# Patient Record
Sex: FEMALE | Race: WHITE | NOT HISPANIC OR LATINO | Employment: PART TIME | URBAN - METROPOLITAN AREA
[De-identification: names, ages, dates, MRNs, and addresses within clinical notes are randomized per-mention and may not be internally consistent; named-entity substitution may affect disease eponyms.]

---

## 2019-07-17 ENCOUNTER — CONSULT (OUTPATIENT)
Dept: PLASTIC SURGERY | Facility: HOSPITAL | Age: 69
End: 2019-07-17
Payer: MEDICARE

## 2019-07-17 VITALS
BODY MASS INDEX: 25.95 KG/M2 | SYSTOLIC BLOOD PRESSURE: 151 MMHG | DIASTOLIC BLOOD PRESSURE: 82 MMHG | WEIGHT: 152 LBS | HEART RATE: 74 BPM | TEMPERATURE: 97.8 F | HEIGHT: 64 IN

## 2019-07-17 DIAGNOSIS — C44.311 BASAL CELL CARCINOMA (BCC) OF SKIN OF NOSE: Primary | ICD-10-CM

## 2019-07-17 PROCEDURE — 99203 OFFICE O/P NEW LOW 30 MIN: CPT | Performed by: SURGERY

## 2019-07-17 NOTE — H&P (VIEW-ONLY)
Assessment/Plan:  Please see HPI  We discussed potential options for reconstruction of the anticipated Mohs defect of the right nasal ala where she has a basal cell carcinoma  The options would include full-thickness skin grafting, adjacent tissue transfer/local flap such as a nasolabial flap, bilobed flap, etc, potentially a composite graft from the ear  We discussed the procedures, how they are performed, as well as potential risks, complications and limitations  Their questions have been answered to their satisfaction and consent has been obtained  There are no diagnoses linked to this encounter  Subjective:   Basal cell carcinoma     Patient ID: Fabrice Ding is a 71 y o  female  HPI Deanna Osorio has a recent diagnosis of basal cell carcinoma of the right nasal ala, she has been referred by Dr Shalonda Gomez  to discuss options for post Mohs reconstruction  The following portions of the patient's history were reviewed and updated as appropriate: allergies, current medications, past family history, past medical history, past social history, past surgical history and problem list     Review of Systems   Constitutional: Negative for chills and fever  HENT: Negative for hearing loss  Eyes: Negative for discharge and visual disturbance  Respiratory: Negative for chest tightness and shortness of breath  Cardiovascular: Negative for chest pain and leg swelling  Gastrointestinal: Negative for blood in stool, constipation, diarrhea and nausea  Genitourinary: Negative for dysuria  Musculoskeletal: Negative for gait problem  Skin: Negative for rash  Allergic/Immunologic: Negative for immunocompromised state  Neurological: Negative for seizures and headaches  Hematological: Does not bruise/bleed easily  Psychiatric/Behavioral: Negative for dysphoric mood  The patient is not nervous/anxious            Objective:      /82   Pulse 74 Comment: pulse ox 98  Temp 97 8 °F (36 6 °C) Ht 5' 4" (1 626 m)   Wt 68 9 kg (152 lb)   BMI 26 09 kg/m²          Physical Exam   Constitutional: She is oriented to person, place, and time  She appears well-developed and well-nourished  HENT:   Head: Normocephalic  Eyes: Pupils are equal, round, and reactive to light  Neck: Normal range of motion  Cardiovascular: Normal rate  Pulmonary/Chest: Effort normal    Abdominal: Soft  Musculoskeletal: Normal range of motion  Neurological: She is alert and oriented to person, place, and time  Skin: Skin is warm  Slightly crusted healing biopsy site right nasal ala, approximately 1 cm in diameter   Psychiatric: She has a normal mood and affect

## 2019-07-17 NOTE — PROGRESS NOTES
Assessment/Plan:  Please see HPI  We discussed potential options for reconstruction of the anticipated Mohs defect of the right nasal ala where she has a basal cell carcinoma  The options would include full-thickness skin grafting, adjacent tissue transfer/local flap such as a nasolabial flap, bilobed flap, etc, potentially a composite graft from the ear  We discussed the procedures, how they are performed, as well as potential risks, complications and limitations  Their questions have been answered to their satisfaction and consent has been obtained  There are no diagnoses linked to this encounter  Subjective:   Basal cell carcinoma     Patient ID: Eugenia Michaels is a 71 y o  female  HPI Harvinder Robles has a recent diagnosis of basal cell carcinoma of the right nasal ala, she has been referred by Dr Cammy Davila  to discuss options for post Mohs reconstruction  The following portions of the patient's history were reviewed and updated as appropriate: allergies, current medications, past family history, past medical history, past social history, past surgical history and problem list     Review of Systems   Constitutional: Negative for chills and fever  HENT: Negative for hearing loss  Eyes: Negative for discharge and visual disturbance  Respiratory: Negative for chest tightness and shortness of breath  Cardiovascular: Negative for chest pain and leg swelling  Gastrointestinal: Negative for blood in stool, constipation, diarrhea and nausea  Genitourinary: Negative for dysuria  Musculoskeletal: Negative for gait problem  Skin: Negative for rash  Allergic/Immunologic: Negative for immunocompromised state  Neurological: Negative for seizures and headaches  Hematological: Does not bruise/bleed easily  Psychiatric/Behavioral: Negative for dysphoric mood  The patient is not nervous/anxious            Objective:      /82   Pulse 74 Comment: pulse ox 98  Temp 97 8 °F (36 6 °C) Ht 5' 4" (1 626 m)   Wt 68 9 kg (152 lb)   BMI 26 09 kg/m²          Physical Exam   Constitutional: She is oriented to person, place, and time  She appears well-developed and well-nourished  HENT:   Head: Normocephalic  Eyes: Pupils are equal, round, and reactive to light  Neck: Normal range of motion  Cardiovascular: Normal rate  Pulmonary/Chest: Effort normal    Abdominal: Soft  Musculoskeletal: Normal range of motion  Neurological: She is alert and oriented to person, place, and time  Skin: Skin is warm  Slightly crusted healing biopsy site right nasal ala, approximately 1 cm in diameter   Psychiatric: She has a normal mood and affect

## 2019-07-17 NOTE — LETTER
July 17, 2019     Falguni Wallace MD  [address]     Patient: Tuan Turk   YOB: 1950   Date of Visit: 7/17/2019       Dear Dr Franics Centeno: Thank you for referring Tuan Turk to me for evaluation  Below are my notes for this consultation  If you have questions, please do not hesitate to call me  I look forward to following your patient along with you  Sincerely,        Janey Francis MD        CC: No Recipients  Janey Francis MD  7/17/2019 11:29 AM  Sign at close encounter  Assessment/Plan:  Please see HPI  We discussed potential options for reconstruction of the anticipated Mohs defect of the right nasal ala where she has a basal cell carcinoma  The options would include full-thickness skin grafting, adjacent tissue transfer/local flap such as a nasolabial flap, bilobed flap, etc, potentially a composite graft from the ear  We discussed the procedures, how they are performed, as well as potential risks, complications and limitations  Their questions have been answered to their satisfaction and consent has been obtained  There are no diagnoses linked to this encounter  Subjective:   Basal cell carcinoma     Patient ID: Tuan Turk is a 71 y o  female  [facility] has a recent diagnosis of basal cell carcinoma of the right nasal ala, she has been referred by Dr Francis Centeno  to discuss options for post Mohs reconstruction  The following portions of the patient's history were reviewed and updated as appropriate: allergies, current medications, past family history, past medical history, past social history, past surgical history and problem list     Review of Systems   Constitutional: Negative for chills and fever  HENT: Negative for hearing loss  Eyes: Negative for discharge and visual disturbance  Respiratory: Negative for chest tightness and shortness of breath      Cardiovascular: Negative for chest pain and leg swelling  Gastrointestinal: Negative for blood in stool, constipation, diarrhea and nausea  Genitourinary: Negative for dysuria  Musculoskeletal: Negative for gait problem  Skin: Negative for rash  Allergic/Immunologic: Negative for immunocompromised state  Neurological: Negative for seizures and headaches  Hematological: Does not bruise/bleed easily  Psychiatric/Behavioral: Negative for dysphoric mood  The patient is not nervous/anxious  Objective:      /82   Pulse 74 Comment: pulse ox 98  Temp 97 8 °F (36 6 °C)   Ht 5' 4" (1 626 m)   Wt 68 9 kg (152 lb)   BMI 26 09 kg/m²           Physical Exam   Constitutional: She is oriented to person, place, and time  She appears well-developed and well-nourished  HENT:   Head: Normocephalic  Eyes: Pupils are equal, round, and reactive to light  Neck: Normal range of motion  Cardiovascular: Normal rate  Pulmonary/Chest: Effort normal    Abdominal: Soft  Musculoskeletal: Normal range of motion  Neurological: She is alert and oriented to person, place, and time  Skin: Skin is warm  Slightly crusted healing biopsy site right nasal ala, approximately 1 cm in diameter   Psychiatric: She has a normal mood and affect

## 2019-07-18 PROBLEM — C44.311 BASAL CELL CARCINOMA OF SKIN OF NOSE: Status: ACTIVE | Noted: 2019-07-18

## 2019-07-22 ENCOUNTER — ANESTHESIA EVENT (OUTPATIENT)
Dept: PERIOP | Facility: AMBULARY SURGERY CENTER | Age: 69
End: 2019-07-22
Payer: MEDICARE

## 2019-07-22 RX ORDER — VITAMIN B COMPLEX
1 CAPSULE ORAL DAILY
COMMUNITY

## 2019-07-22 RX ORDER — OMEGA-3-ACID ETHYL ESTERS 1 G/1
2 CAPSULE, LIQUID FILLED ORAL 2 TIMES DAILY
COMMUNITY

## 2019-07-22 RX ORDER — MAGNESIUM 30 MG
30 TABLET ORAL 2 TIMES DAILY
COMMUNITY

## 2019-07-22 RX ORDER — DIPHENOXYLATE HYDROCHLORIDE AND ATROPINE SULFATE 2.5; .025 MG/1; MG/1
1 TABLET ORAL DAILY
COMMUNITY

## 2019-07-22 NOTE — PRE-PROCEDURE INSTRUCTIONS
Pre-Surgery Instructions:   Medication Instructions    b complex vitamins capsule Patient was instructed by Physician and understands   magnesium 30 MG tablet Patient was instructed by Physician and understands   multivitamin [facility]) TABS Patient was instructed by Physician and understands   omega-3-acid ethyl esters (LOVAZA) 1 g capsule Patient was instructed by Physician and understands  Pre op and bathing instructions reviewed

## 2019-07-23 ENCOUNTER — CLINICAL SUPPORT (OUTPATIENT)
Dept: URGENT CARE | Facility: MEDICAL CENTER | Age: 69
End: 2019-07-23
Payer: MEDICARE

## 2019-07-23 ENCOUNTER — APPOINTMENT (OUTPATIENT)
Dept: LAB | Facility: MEDICAL CENTER | Age: 69
End: 2019-07-23
Payer: MEDICARE

## 2019-07-23 DIAGNOSIS — C44.311 BASAL CELL CARCINOMA OF SKIN OF NOSE: ICD-10-CM

## 2019-07-23 LAB
ANION GAP SERPL CALCULATED.3IONS-SCNC: 5 MMOL/L (ref 4–13)
ATRIAL RATE: 59 BPM
BASOPHILS # BLD AUTO: 0.07 THOUSANDS/ΜL (ref 0–0.1)
BASOPHILS NFR BLD AUTO: 1 % (ref 0–1)
BUN SERPL-MCNC: 16 MG/DL (ref 5–25)
CALCIUM SERPL-MCNC: 9.3 MG/DL (ref 8.3–10.1)
CHLORIDE SERPL-SCNC: 110 MMOL/L (ref 100–108)
CO2 SERPL-SCNC: 30 MMOL/L (ref 21–32)
CREAT SERPL-MCNC: 0.71 MG/DL (ref 0.6–1.3)
EOSINOPHIL # BLD AUTO: 0.14 THOUSAND/ΜL (ref 0–0.61)
EOSINOPHIL NFR BLD AUTO: 2 % (ref 0–6)
ERYTHROCYTE [DISTWIDTH] IN BLOOD BY AUTOMATED COUNT: 13.8 % (ref 11.6–15.1)
GFR SERPL CREATININE-BSD FRML MDRD: 87 ML/MIN/1.73SQ M
GLUCOSE P FAST SERPL-MCNC: 86 MG/DL (ref 65–99)
HCT VFR BLD AUTO: 39.9 % (ref 34.8–46.1)
HGB BLD-MCNC: 12.6 G/DL (ref 11.5–15.4)
IMM GRANULOCYTES # BLD AUTO: 0.03 THOUSAND/UL (ref 0–0.2)
IMM GRANULOCYTES NFR BLD AUTO: 0 % (ref 0–2)
LYMPHOCYTES # BLD AUTO: 2.53 THOUSANDS/ΜL (ref 0.6–4.47)
LYMPHOCYTES NFR BLD AUTO: 38 % (ref 14–44)
MCH RBC QN AUTO: 30.4 PG (ref 26.8–34.3)
MCHC RBC AUTO-ENTMCNC: 31.6 G/DL (ref 31.4–37.4)
MCV RBC AUTO: 96 FL (ref 82–98)
MONOCYTES # BLD AUTO: 0.71 THOUSAND/ΜL (ref 0.17–1.22)
MONOCYTES NFR BLD AUTO: 11 % (ref 4–12)
NEUTROPHILS # BLD AUTO: 3.19 THOUSANDS/ΜL (ref 1.85–7.62)
NEUTS SEG NFR BLD AUTO: 48 % (ref 43–75)
NRBC BLD AUTO-RTO: 0 /100 WBCS
P AXIS: 72 DEGREES
PLATELET # BLD AUTO: 244 THOUSANDS/UL (ref 149–390)
PMV BLD AUTO: 11.7 FL (ref 8.9–12.7)
POTASSIUM SERPL-SCNC: 4.4 MMOL/L (ref 3.5–5.3)
PR INTERVAL: 146 MS
QRS AXIS: 39 DEGREES
QRSD INTERVAL: 78 MS
QT INTERVAL: 428 MS
QTC INTERVAL: 423 MS
RBC # BLD AUTO: 4.15 MILLION/UL (ref 3.81–5.12)
SODIUM SERPL-SCNC: 145 MMOL/L (ref 136–145)
T WAVE AXIS: 44 DEGREES
VENTRICULAR RATE: 59 BPM
WBC # BLD AUTO: 6.67 THOUSAND/UL (ref 4.31–10.16)

## 2019-07-23 PROCEDURE — 80048 BASIC METABOLIC PNL TOTAL CA: CPT

## 2019-07-23 PROCEDURE — 85025 COMPLETE CBC W/AUTO DIFF WBC: CPT

## 2019-07-23 PROCEDURE — 36415 COLL VENOUS BLD VENIPUNCTURE: CPT

## 2019-07-23 PROCEDURE — 93010 ELECTROCARDIOGRAM REPORT: CPT | Performed by: INTERNAL MEDICINE

## 2019-07-30 ENCOUNTER — HOSPITAL ENCOUNTER (OUTPATIENT)
Facility: AMBULARY SURGERY CENTER | Age: 69
Setting detail: OUTPATIENT SURGERY
Discharge: HOME | End: 2019-07-30
Attending: SURGERY | Admitting: SURGERY
Payer: MEDICARE

## 2019-07-30 ENCOUNTER — ANESTHESIA (OUTPATIENT)
Dept: PERIOP | Facility: AMBULARY SURGERY CENTER | Age: 69
End: 2019-07-30
Payer: MEDICARE

## 2019-07-30 VITALS
WEIGHT: 150 LBS | OXYGEN SATURATION: 99 % | HEART RATE: 78 BPM | HEIGHT: 64 IN | DIASTOLIC BLOOD PRESSURE: 68 MMHG | SYSTOLIC BLOOD PRESSURE: 122 MMHG | BODY MASS INDEX: 25.61 KG/M2 | RESPIRATION RATE: 16 BRPM | TEMPERATURE: 98.2 F

## 2019-07-30 PROCEDURE — 15004 WOUND PREP F/N/HF/G: CPT | Performed by: SURGERY

## 2019-07-30 PROCEDURE — 14061 TIS TRNFR E/N/E/L10.1-30SQCM: CPT | Performed by: SURGERY

## 2019-07-30 RX ORDER — SODIUM CHLORIDE, SODIUM LACTATE, POTASSIUM CHLORIDE, CALCIUM CHLORIDE 600; 310; 30; 20 MG/100ML; MG/100ML; MG/100ML; MG/100ML
125 INJECTION, SOLUTION INTRAVENOUS CONTINUOUS
Status: DISCONTINUED | OUTPATIENT
Start: 2019-07-30 | End: 2019-07-30 | Stop reason: HOSPADM

## 2019-07-30 RX ORDER — ONDANSETRON 2 MG/ML
INJECTION INTRAMUSCULAR; INTRAVENOUS AS NEEDED
Status: DISCONTINUED | OUTPATIENT
Start: 2019-07-30 | End: 2019-07-30 | Stop reason: SURG

## 2019-07-30 RX ORDER — ACETAMINOPHEN 325 MG/1
650 TABLET ORAL EVERY 6 HOURS PRN
Status: DISCONTINUED | OUTPATIENT
Start: 2019-07-30 | End: 2019-07-30 | Stop reason: HOSPADM

## 2019-07-30 RX ORDER — GLYCOPYRROLATE 0.2 MG/ML
INJECTION INTRAMUSCULAR; INTRAVENOUS AS NEEDED
Status: DISCONTINUED | OUTPATIENT
Start: 2019-07-30 | End: 2019-07-30 | Stop reason: SURG

## 2019-07-30 RX ORDER — PROPOFOL 10 MG/ML
INJECTION, EMULSION INTRAVENOUS AS NEEDED
Status: DISCONTINUED | OUTPATIENT
Start: 2019-07-30 | End: 2019-07-30 | Stop reason: SURG

## 2019-07-30 RX ORDER — GINSENG 100 MG
CAPSULE ORAL AS NEEDED
Status: DISCONTINUED | OUTPATIENT
Start: 2019-07-30 | End: 2019-07-30 | Stop reason: HOSPADM

## 2019-07-30 RX ORDER — FENTANYL CITRATE 50 UG/ML
INJECTION, SOLUTION INTRAMUSCULAR; INTRAVENOUS AS NEEDED
Status: DISCONTINUED | OUTPATIENT
Start: 2019-07-30 | End: 2019-07-30 | Stop reason: SURG

## 2019-07-30 RX ORDER — CEFAZOLIN SODIUM 1 G/50ML
1000 SOLUTION INTRAVENOUS ONCE
Status: COMPLETED | OUTPATIENT
Start: 2019-07-30 | End: 2019-07-30

## 2019-07-30 RX ORDER — MAGNESIUM HYDROXIDE 1200 MG/15ML
LIQUID ORAL AS NEEDED
Status: DISCONTINUED | OUTPATIENT
Start: 2019-07-30 | End: 2019-07-30 | Stop reason: HOSPADM

## 2019-07-30 RX ORDER — LIDOCAINE HYDROCHLORIDE 10 MG/ML
INJECTION, SOLUTION INFILTRATION; PERINEURAL AS NEEDED
Status: DISCONTINUED | OUTPATIENT
Start: 2019-07-30 | End: 2019-07-30 | Stop reason: SURG

## 2019-07-30 RX ORDER — DEXAMETHASONE SODIUM PHOSPHATE 10 MG/ML
INJECTION, SOLUTION INTRAMUSCULAR; INTRAVENOUS AS NEEDED
Status: DISCONTINUED | OUTPATIENT
Start: 2019-07-30 | End: 2019-07-30 | Stop reason: SURG

## 2019-07-30 RX ORDER — LIDOCAINE HYDROCHLORIDE 10 MG/ML
0.5 INJECTION, SOLUTION EPIDURAL; INFILTRATION; INTRACAUDAL; PERINEURAL ONCE AS NEEDED
Status: DISCONTINUED | OUTPATIENT
Start: 2019-07-30 | End: 2019-07-30 | Stop reason: HOSPADM

## 2019-07-30 RX ORDER — FENTANYL CITRATE/PF 50 MCG/ML
25 SYRINGE (ML) INJECTION
Status: DISCONTINUED | OUTPATIENT
Start: 2019-07-30 | End: 2019-07-30 | Stop reason: HOSPADM

## 2019-07-30 RX ORDER — LIDOCAINE HYDROCHLORIDE AND EPINEPHRINE 10; 10 MG/ML; UG/ML
INJECTION, SOLUTION INFILTRATION; PERINEURAL AS NEEDED
Status: DISCONTINUED | OUTPATIENT
Start: 2019-07-30 | End: 2019-07-30 | Stop reason: HOSPADM

## 2019-07-30 RX ORDER — ONDANSETRON 2 MG/ML
4 INJECTION INTRAMUSCULAR; INTRAVENOUS ONCE AS NEEDED
Status: DISCONTINUED | OUTPATIENT
Start: 2019-07-30 | End: 2019-07-30 | Stop reason: HOSPADM

## 2019-07-30 RX ADMIN — PROPOFOL 200 MG: 10 INJECTION, EMULSION INTRAVENOUS at 13:05

## 2019-07-30 RX ADMIN — ONDANSETRON 4 MG: 2 INJECTION INTRAMUSCULAR; INTRAVENOUS at 13:01

## 2019-07-30 RX ADMIN — FENTANYL CITRATE 25 MCG: 50 INJECTION, SOLUTION INTRAMUSCULAR; INTRAVENOUS at 14:07

## 2019-07-30 RX ADMIN — PHENYLEPHRINE HYDROCHLORIDE 50 MCG: 10 INJECTION INTRAVENOUS at 13:36

## 2019-07-30 RX ADMIN — LIDOCAINE HYDROCHLORIDE ANHYDROUS 50 MG: 10 INJECTION, SOLUTION INFILTRATION at 13:05

## 2019-07-30 RX ADMIN — DEXAMETHASONE SODIUM PHOSPHATE 4 MG: 10 INJECTION, SOLUTION INTRAMUSCULAR; INTRAVENOUS at 13:07

## 2019-07-30 RX ADMIN — FENTANYL CITRATE 50 MCG: 50 INJECTION, SOLUTION INTRAMUSCULAR; INTRAVENOUS at 14:05

## 2019-07-30 RX ADMIN — GLYCOPYRROLATE 0.1 MG: 0.2 INJECTION, SOLUTION INTRAMUSCULAR; INTRAVENOUS at 13:01

## 2019-07-30 RX ADMIN — PHENYLEPHRINE HYDROCHLORIDE 150 MCG: 10 INJECTION INTRAVENOUS at 13:29

## 2019-07-30 RX ADMIN — PHENYLEPHRINE HYDROCHLORIDE 50 MCG: 10 INJECTION INTRAVENOUS at 13:39

## 2019-07-30 RX ADMIN — CEFAZOLIN SODIUM 1000 MG: 1 SOLUTION INTRAVENOUS at 13:00

## 2019-07-30 RX ADMIN — SODIUM CHLORIDE, SODIUM LACTATE, POTASSIUM CHLORIDE, AND CALCIUM CHLORIDE: .6; .31; .03; .02 INJECTION, SOLUTION INTRAVENOUS at 12:30

## 2019-07-30 RX ADMIN — FENTANYL CITRATE 25 MCG: 50 INJECTION, SOLUTION INTRAMUSCULAR; INTRAVENOUS at 14:03

## 2019-07-30 NOTE — OP NOTE
OPERATIVE REPORT  PATIENT NAME: Trinidad Perez    :  1950  MRN: [id number]  Pt Location: [facility] OR [facility] 06    SURGERY DATE: 2019    Surgeon(s) and Role:     * Malathi Romo MD - Primary     * Katty Martínez PA-C - Assisting    Preop Diagnosis:  Basal cell carcinoma of skin of nose [C44 311]    Post-Op Diagnosis Codes: * Basal cell carcinoma of skin of nose [C44 311]    Procedure:  1  Preparation Mohs defect right nasal ala, excision of wound margins to prepare for reconstruction () 2  Reconstruction Mohs defect right nasal ala with superiorly based nasolabial flap 3 5 by 3 0 cm  Specimen(s):  * No specimens in log *    Estimated Blood Loss:   Minimal    Drains:  * No LDAs found *    Anesthesia Type:   General/LMA    Operative Indications:  Basal cell carcinoma of skin of nose [C44 311]  Status post Mohs    Operative Findings:  As above    Complications:   None    Procedure and Technique:  Salo Baugh was seen preoperatively in the holding area and the surgical site was marked with her participation  We reviewed the planned procedure as well as potential risks, complications, and limitations  She was then taken to the operating room and underwent induction of anesthesia by the anesthesia personnel  The operative field was prepped and draped in sterile fashion a proper time-out was performed  2 5 loupe magnification was used throughout the procedure to aid visualization  The defect was carefully analyzed and the margins were infiltrated with xylocaine with epinephrine  A 15 blade was then used to excise the circumferential margin in order to remove the cautery artifact and to prepare the wound for reconstruction  Given the size and location of the defect, reconstruction was planned with a superiorly based nasolabial flap  The margins were marked out with a surgical marking pen and infiltrated with xylocaine with epinephrine    Skin incisions were created with a 15 blade, carried down through the dermis and the flap was elevated from distal proximal toward its base utilizing a spreading technique with tenotomy scissors  The wound was irrigated hemostasis assured  The donor site defect was closed initially with a 4-0 PDS suture sutured at the level of the deep dermis down to the underlying periosteum in order to recreate the nasal facial sulcus  Remainder of the donor site was closed with 5 O Monocryl buried at the level of the deep dermis this was followed by running subcuticular 5 O Monocryl  The flap was trimmed distally to fit the defect, and was inset at the distal most aspect utilizing 5 O Vicryl buried at the level of the deep dermis  The distal 2/3 were then closed at the skin level utilizing 6 0 nylon suture  Prior to insetting the flap that was the end in order to provide adequate thickness to match the depth of the defect  The undersurface of the flap was protected with bacitracin and Xeroform and a Steri-Strip was applied to the donor site  The patient was transferred to the recovery room     I was present for the entire procedure    Patient Disposition:  PACU     SIGNATURE: Marco Antonio Vidal MD  DATE: July 30, 2019  TIME: 2:52 PM

## 2019-07-30 NOTE — ANESTHESIA POSTPROCEDURE EVALUATION
Post-Op Assessment Note    CV Status:  Stable  Pain Score: 0    Pain management: adequate     Mental Status:  Alert and awake   Hydration Status:  Euvolemic   PONV Controlled:  Controlled   Airway Patency:  Patent   Post Op Vitals Reviewed: Yes      Staff: Anesthesiologist           BP   126/90   Temp   97 6   Pulse  78   Resp   18   SpO2   100

## 2019-07-30 NOTE — ANESTHESIA PREPROCEDURE EVALUATION
Review of Systems/Medical History  Patient summary reviewed  Chart reviewed  No history of anesthetic complications     Cardiovascular  Negative cardio ROS Exercise tolerance (METS): >4,  No CERDA,    Pulmonary  Not a smoker , No shortness of breath, No recent URI ,        GI/Hepatic    No GERD ,             Endo/Other     GYN       Hematology   Musculoskeletal  Osteoarthritis,   Comment: Basal cell carcinoma Arthritis     Neurology   Psychology           Physical Exam    Airway    Mallampati score: II  TM Distance: <3 FB  Neck ROM: full     Dental   No notable dental hx     Cardiovascular  Comment: Negative ROS, Rhythm: regular, Cardiovascular exam normal    Pulmonary  Pulmonary exam normal Breath sounds clear to auscultation,     Other Findings        Anesthesia Plan  ASA Score- 2     Anesthesia Type- general with ASA Monitors  Additional Monitors:   Airway Plan: LMA  Plan Factors-    Induction- intravenous  Postoperative Plan- Plan for postoperative opioid use  Planned trial extubation    Informed Consent- Anesthetic plan and risks discussed with patient  I personally reviewed this patient with the CRNA  Discussed and agreed on the Anesthesia Plan with the CRNA  Janett Crabtree

## 2019-07-30 NOTE — INTERIM OP NOTE
NOSE MOHS DEFECT RECONSTRUCTION, NOSE FLAP  Postoperative Note  PATIENT NAME: Temo Dias  : 1950  MRN: [id number]  [facility]    Surgery Date: 2019    Preop Diagnosis:  Basal cell carcinoma of skin of nose [C44 311]    Post-Op Diagnosis Codes:      * Basal cell carcinoma of skin of nose [C44 311]    Procedure(s) (LRB):  NOSE MOHS DEFECT RECONSTRUCTION (N/A)  NOSE FLAP (N/A)    Surgeon(s) and Role:     * Edilma Cruz MD - Primary     * Comfort Villegas PA-C - Assisting    Specimens:  * No specimens in log *    Estimated Blood Loss:   Minimal    Anesthesia Type:   General/LMA     Findings:    see op note   Complications:   None    SIGNATURE: Sondra Bronson MD   DATE: 2019   TIME: 2:15 PM

## 2019-07-30 NOTE — DISCHARGE INSTRUCTIONS
[facility]  Dr Brower Musa   [address] [place] [address] 144, [address]  Phone: [phone]     Postoperative Instructions for Outpatient Surgery     These instructions are being provided by your doctor to give you basic guidelines during your post-op recovery  Please let our office know if your contact information has changed       Please call the office today for an appointment this Thursday 8/1/19 for postoperative care and dressing changes       Dressings: keep dressings dry and in place; do not remove       Activity Restrictions: No strenuous activity      Bathing:  Sponge bath only  Do not wet face or head     Medications:    Resume pre-op medications     You may take tylenol, aleve, or ibuprofen for pain control               Other:   Keep head of bed elevated greater than 30 degrees while in bed/sleeping  Do not apply ice to face/nose

## 2019-08-01 ENCOUNTER — OFFICE VISIT (OUTPATIENT)
Dept: PLASTIC SURGERY | Facility: CLINIC | Age: 69
End: 2019-08-01

## 2019-08-01 VITALS — BODY MASS INDEX: 25.61 KG/M2 | WEIGHT: 150 LBS | HEIGHT: 64 IN

## 2019-08-01 DIAGNOSIS — Z98.890 POST-OPERATIVE STATE: Primary | ICD-10-CM

## 2019-08-01 PROCEDURE — 99024 POSTOP FOLLOW-UP VISIT: CPT | Performed by: SURGERY

## 2019-08-01 NOTE — PATIENT INSTRUCTIONS
Patient to change dressings daily, apply Xeroform and Aquaphor and follow up in 2 weeks for suture removal

## 2019-08-01 NOTE — PROGRESS NOTES
Robbi Plan is a 71 y o  Female status post Preparation Mohs defect right nasal ala, excision of wound margins to prepare for reconstruction (15004) 2  Reconstruction Mohs defect right nasal ala with superiorly based nasolabial flap 3 5 by 3 0 cm  Patient in office today for first post op  The xeroform and Aquaphor removed  New Aquaphor and xeroform place while patient watched and the wound care was explained  patient will follow up in two weeks with Dr Sundar Jack

## 2019-08-14 ENCOUNTER — OFFICE VISIT (OUTPATIENT)
Dept: PLASTIC SURGERY | Facility: HOSPITAL | Age: 69
End: 2019-08-14

## 2019-08-14 DIAGNOSIS — Z48.89 POSTOPERATIVE VISIT: Primary | ICD-10-CM

## 2019-08-14 PROCEDURE — 99024 POSTOP FOLLOW-UP VISIT: CPT | Performed by: SURGERY

## 2019-08-14 PROCEDURE — 1124F ACP DISCUSS-NO DSCNMKR DOCD: CPT | Performed by: SURGERY

## 2019-08-14 NOTE — PROGRESS NOTES
Amina Stephens presents today in follow-up, 2 weeks status post a superiorly based nasolabial flap reconstruction of a Mohs defect of the right nasal ala  The flap appears to be fully viable, every other suture was removed from the distal aspect of the flap, the dressing was changed  We discussed division and inset (scheduled for next week), how it is performed, as well as potential risks, complications limitations  Their questions were interest to their satisfaction, consent was obtained

## 2019-08-19 PROCEDURE — NC001 PR NO CHARGE: Performed by: PHYSICIAN ASSISTANT

## 2019-08-19 NOTE — H&P
Claudean Quivers presents today in follow-up, 2 weeks status post a superiorly based nasolabial flap reconstruction of a Mohs defect of the right nasal ala  The flap appears to be fully viable, every other suture was removed from the distal aspect of the flap, the dressing was changed  We discussed division and inset (scheduled for next week), how it is performed, as well as potential risks, complications limitations  Their questions were interest to their satisfaction, consent was obtained  Subjective:   Basal cell carcinoma      Patient ID: Eleazar Coronado is a 71 y o  female      HPI Claudean Quivers has a recent diagnosis of basal cell carcinoma of the right nasal ala, she has been referred by Dr Delaney      The following portions of the patient's history were reviewed and updated as appropriate: allergies, current medications, past family history, past medical history, past social history, past surgical history and problem list      Review of Systems   Constitutional: Negative for chills and fever  HENT: Negative for hearing loss  Eyes: Negative for discharge and visual disturbance  Respiratory: Negative for chest tightness and shortness of breath  Cardiovascular: Negative for chest pain and leg swelling  Gastrointestinal: Negative for blood in stool, constipation, diarrhea and nausea  Genitourinary: Negative for dysuria  Musculoskeletal: Negative for gait problem  Skin: Negative for rash  Allergic/Immunologic: Negative for immunocompromised state  Neurological: Negative for seizures and headaches  Hematological: Does not bruise/bleed easily  Psychiatric/Behavioral: Negative for dysphoric mood  The patient is not nervous/anxious            Objective:        /82   Pulse 74 Comment: pulse ox 98  Temp 97 8 °F (36 6 °C)   Ht 5' 4" (1 626 m)   Wt 68 9 kg (152 lb)   BMI 26 09 kg/m²             Physical Exam   Constitutional: She is oriented to person, place, and time   She appears well-developed and well-nourished  HENT:   Head: Normocephalic  Eyes: Pupils are equal, round, and reactive to light  Neck: Normal range of motion  Cardiovascular: Normal rate  Pulmonary/Chest: Effort normal    Abdominal: Soft  Musculoskeletal: Normal range of motion  Neurological: She is alert and oriented to person, place, and time  Skin: Skin is warm  Psychiatric: She has a normal mood and affect

## 2019-08-19 NOTE — PRE-PROCEDURE INSTRUCTIONS
Pre-Surgery Instructions:   Medication Instructions    b complex vitamins capsule Instructed patient per Anesthesia Guidelines   magnesium 30 MG tablet Instructed patient per Anesthesia Guidelines   multivitamin (THERAGRAN) TABS Instructed patient per Anesthesia Guidelines   omega-3-acid ethyl esters (LOVAZA) 1 g capsule Instructed patient per Anesthesia Guidelines   Probiotic Product (PROBIOTIC DAILY PO) Instructed patient per Anesthesia Guidelines  Before your operation, you play an important role in decreasing your risk for infection by washing with special antiseptic soap  This is an effective way to reduce bacteria on the skin which may help to prevent infections at the surgical site  Please read the following directions in advance  1  In the week before your operation purchase a 4 ounce bottle of antiseptic soap containing chlorhexidine gluconate 4%  Some brand names include: Aplicare, Endure, and Hibiclens  The cost is usually less than $5 00  · For your convenience, the [address] [place] [address] carries the soap  · It may also be available at your doctor's office or pre-admission testing center, and at most retail pharmacies  · If you are allergic or sensitive to soaps containing chlorhexidine gluconate (CHG), please let your doctor know so another antiseptic soap can be suggested  · CHG antiseptic soap is for external use only  2      The day before your operation follow these directions carefully to get ready  · Place clean lines (sheets) on your bed; you should sleep on clean sheets after your evening shower  · Get clean towels and washcloths ready - you need enough for 2 showers  · Set aside clean underwear, pajamas, and clothing to wear after the shower  Reminders:  · DO NOT use any other soap or body rinse on your skin during or after the antiseptic showers    · DO NOT use lotion , powder, deodorant, or perfume/aftershave of any kind on your skin after your antiseptic shower  · DO NOT shave any body parts in the 24 hours/the day before your operation  · DO NOT get the antiseptic soap in your eyes, ears, nose, mouth, or vaginal area  3      You will need to shower the night before AND the morning of your Surgery  Shower 1:  · The evening before your operation, take the fist shower  · First, shampoo your hair with regular shampoo and rinse it completely before you use the anitseptic soap  After washing and rinsing your hair, rinse your body  · Next, use a clean wash cloth to apply the antiseptic soap and wash your body from the neck down to your toes using 1/2 bottle of the antiseptic soap  You will use the other 1/2 bottle for the second shower  · Clean the area where your incision will be; later this area well for about 2 minutes  · If you ar having head or neck surgery, wash areas with the antiseptic soap  · Rinse yourself completely with running water  · Use a clean towel to dry off  · Wear clean underwear and clothing/pajamas  Shower 2:  · The Morning of your operation, take the second shower following the same steps as Shower 1 using the second 1/2 of the bottle of antiseptic soap  · Use clean cloths and towels to was and dry yourself off  · Wear clean underwear and clothing

## 2019-08-22 ENCOUNTER — HOSPITAL ENCOUNTER (OUTPATIENT)
Facility: HOSPITAL | Age: 69
Setting detail: OUTPATIENT SURGERY
Discharge: HOME | End: 2019-08-22
Attending: SURGERY | Admitting: SURGERY
Payer: MEDICARE

## 2019-08-22 ENCOUNTER — ANESTHESIA (OUTPATIENT)
Dept: PERIOP | Facility: HOSPITAL | Age: 69
End: 2019-08-22
Payer: MEDICARE

## 2019-08-22 ENCOUNTER — ANESTHESIA EVENT (OUTPATIENT)
Dept: PERIOP | Facility: HOSPITAL | Age: 69
End: 2019-08-22
Payer: MEDICARE

## 2019-08-22 VITALS
HEART RATE: 70 BPM | OXYGEN SATURATION: 99 % | TEMPERATURE: 97.8 F | SYSTOLIC BLOOD PRESSURE: 132 MMHG | DIASTOLIC BLOOD PRESSURE: 64 MMHG | HEIGHT: 64 IN | RESPIRATION RATE: 18 BRPM | WEIGHT: 148.5 LBS | BODY MASS INDEX: 25.35 KG/M2

## 2019-08-22 PROCEDURE — 15630 DELAY FLAP EYE/NOS/EAR/LIP: CPT | Performed by: SURGERY

## 2019-08-22 RX ORDER — OXYCODONE HYDROCHLORIDE AND ACETAMINOPHEN 5; 325 MG/1; MG/1
1 TABLET ORAL EVERY 4 HOURS PRN
Status: CANCELLED | OUTPATIENT
Start: 2019-08-22

## 2019-08-22 RX ORDER — EPHEDRINE SULFATE 50 MG/ML
INJECTION INTRAVENOUS AS NEEDED
Status: DISCONTINUED | OUTPATIENT
Start: 2019-08-22 | End: 2019-08-22 | Stop reason: SURG

## 2019-08-22 RX ORDER — ACETAMINOPHEN 325 MG/1
650 TABLET ORAL EVERY 6 HOURS PRN
Status: DISCONTINUED | OUTPATIENT
Start: 2019-08-22 | End: 2019-08-22 | Stop reason: HOSPADM

## 2019-08-22 RX ORDER — HYDROMORPHONE HCL/PF 1 MG/ML
0.5 SYRINGE (ML) INJECTION
Status: DISCONTINUED | OUTPATIENT
Start: 2019-08-22 | End: 2019-08-22 | Stop reason: HOSPADM

## 2019-08-22 RX ORDER — GINSENG 100 MG
CAPSULE ORAL AS NEEDED
Status: DISCONTINUED | OUTPATIENT
Start: 2019-08-22 | End: 2019-08-22 | Stop reason: HOSPADM

## 2019-08-22 RX ORDER — MIDAZOLAM HYDROCHLORIDE 1 MG/ML
INJECTION INTRAMUSCULAR; INTRAVENOUS AS NEEDED
Status: DISCONTINUED | OUTPATIENT
Start: 2019-08-22 | End: 2019-08-22 | Stop reason: SURG

## 2019-08-22 RX ORDER — PROPOFOL 10 MG/ML
INJECTION, EMULSION INTRAVENOUS AS NEEDED
Status: DISCONTINUED | OUTPATIENT
Start: 2019-08-22 | End: 2019-08-22 | Stop reason: SURG

## 2019-08-22 RX ORDER — CEFAZOLIN SODIUM 1 G/50ML
1000 SOLUTION INTRAVENOUS ONCE
Status: COMPLETED | OUTPATIENT
Start: 2019-08-22 | End: 2019-08-22

## 2019-08-22 RX ORDER — FENTANYL CITRATE 50 UG/ML
INJECTION, SOLUTION INTRAMUSCULAR; INTRAVENOUS AS NEEDED
Status: DISCONTINUED | OUTPATIENT
Start: 2019-08-22 | End: 2019-08-22 | Stop reason: SURG

## 2019-08-22 RX ORDER — ONDANSETRON 2 MG/ML
INJECTION INTRAMUSCULAR; INTRAVENOUS AS NEEDED
Status: DISCONTINUED | OUTPATIENT
Start: 2019-08-22 | End: 2019-08-22 | Stop reason: SURG

## 2019-08-22 RX ORDER — FENTANYL CITRATE/PF 50 MCG/ML
25 SYRINGE (ML) INJECTION
Status: DISCONTINUED | OUTPATIENT
Start: 2019-08-22 | End: 2019-08-22 | Stop reason: HOSPADM

## 2019-08-22 RX ORDER — SODIUM CHLORIDE, SODIUM LACTATE, POTASSIUM CHLORIDE, CALCIUM CHLORIDE 600; 310; 30; 20 MG/100ML; MG/100ML; MG/100ML; MG/100ML
125 INJECTION, SOLUTION INTRAVENOUS CONTINUOUS
Status: DISCONTINUED | OUTPATIENT
Start: 2019-08-22 | End: 2019-08-22 | Stop reason: HOSPADM

## 2019-08-22 RX ADMIN — FENTANYL CITRATE 50 MCG: 50 INJECTION, SOLUTION INTRAMUSCULAR; INTRAVENOUS at 11:07

## 2019-08-22 RX ADMIN — SODIUM CHLORIDE, SODIUM LACTATE, POTASSIUM CHLORIDE, AND CALCIUM CHLORIDE 125 ML/HR: .6; .31; .03; .02 INJECTION, SOLUTION INTRAVENOUS at 10:13

## 2019-08-22 RX ADMIN — EPHEDRINE SULFATE 5 MG: 50 INJECTION, SOLUTION INTRAVENOUS at 11:23

## 2019-08-22 RX ADMIN — FENTANYL CITRATE 25 MCG: 50 INJECTION, SOLUTION INTRAMUSCULAR; INTRAVENOUS at 12:15

## 2019-08-22 RX ADMIN — ONDANSETRON 4 MG: 2 INJECTION INTRAMUSCULAR; INTRAVENOUS at 12:04

## 2019-08-22 RX ADMIN — FENTANYL CITRATE 25 MCG: 50 INJECTION, SOLUTION INTRAMUSCULAR; INTRAVENOUS at 12:00

## 2019-08-22 RX ADMIN — EPHEDRINE SULFATE 10 MG: 50 INJECTION, SOLUTION INTRAVENOUS at 11:27

## 2019-08-22 RX ADMIN — SODIUM CHLORIDE, SODIUM LACTATE, POTASSIUM CHLORIDE, AND CALCIUM CHLORIDE: .6; .31; .03; .02 INJECTION, SOLUTION INTRAVENOUS at 11:04

## 2019-08-22 RX ADMIN — PROPOFOL 150 MG: 10 INJECTION, EMULSION INTRAVENOUS at 11:08

## 2019-08-22 RX ADMIN — MIDAZOLAM 2 MG: 1 INJECTION INTRAMUSCULAR; INTRAVENOUS at 11:06

## 2019-08-22 RX ADMIN — ACETAMINOPHEN 650 MG: 325 TABLET, FILM COATED ORAL at 13:18

## 2019-08-22 RX ADMIN — CEFAZOLIN SODIUM 1000 MG: 1 SOLUTION INTRAVENOUS at 11:18

## 2019-08-22 NOTE — ANESTHESIA POSTPROCEDURE EVALUATION
Post-Op Assessment Note    CV Status:  Stable  Pain Score: 0    Pain management: adequate     Mental Status:  Alert and awake   Hydration Status:  Euvolemic   PONV Controlled:  Controlled   Airway Patency:  Patent   Post Op Vitals Reviewed: Yes      Staff: CRNA, Anesthesiologist           BP      Temp      Pulse     Resp      SpO2

## 2019-08-22 NOTE — INTERIM OP NOTE
DIVISION/INSET NASOLABIAL FLAP TO NOSE  Postoperative Note  PATIENT NAME: Bere Jaimes  : 1950  MRN: [id number]  [facility] ROOM 02    Surgery Date: 2019    Preop Diagnosis:  Basal cell carcinoma of skin of nose [C44 311]    Post-Op Diagnosis Codes:      * Basal cell carcinoma of skin of nose [C44 311]    Procedure(s) (LRB):  DIVISION/INSET NASOLABIAL FLAP TO NOSE (N/A)    Surgeon(s) and Role:     Syed Estevez MD - Primary    Specimens:  * No specimens in log *    Estimated Blood Loss:   Minimal    Anesthesia Type:   General     Findings:    None  Complications:   None    SIGNATURE: Haris Moreira PA-C   DATE: 2019   TIME: 11:44 AM

## 2019-08-22 NOTE — ANESTHESIA PREPROCEDURE EVALUATION
Review of Systems/Medical History  Patient summary reviewed  Chart reviewed      Cardiovascular  EKG reviewed, Exercise tolerance (METS): >4,     Pulmonary  Negative pulmonary ROS        GI/Hepatic  Negative GI/hepatic ROS          Negative  ROS        Endo/Other  Negative endo/other ROS      GYN  Negative gynecology ROS          Hematology  Negative hematology ROS      Musculoskeletal  Negative musculoskeletal ROS        Neurology  Negative neurology ROS      Psychology   Negative psychology ROS                   Anesthesia Plan  ASA Score- 2     Anesthesia Type- general with ASA Monitors  Additional Monitors:   Airway Plan: LMA  Comment: Benefits and risks of planned anesthetic discussed with patient, and agrees to proceed  I, Dr Ada Abbott, the attending anesthesiologist, have personally seen and evaluated the patient prior to anesthetic care  I have reviewed the preanesthetic record, and other medical records if appropriate to the anesthetic care  If a CRNA is involved in the case, I have reviewed the CRNA assessment, if present, and agree  The patient is in a suitable condition to proceed with my formulated anesthetic plan        Plan Factors-    Induction- intravenous  Postoperative Plan- Plan for postoperative opioid use  Informed Consent- Anesthetic plan and risks discussed with patient

## 2019-08-22 NOTE — INTERVAL H&P NOTE
H&P reviewed  After examining the patient I find no changes in the patients condition since the H&P had been written      Vitals:    08/22/19 1004   BP: 158/69   Pulse: 72   Resp: 16   Temp: 98 5 °F (36 9 °C)   SpO2: 98%

## 2019-08-22 NOTE — OP NOTE
OPERATIVE REPORT  PATIENT NAME: Fabrice Ding    :  1950  MRN: [id number]  Pt Location: [facility] OR ROOM 02    SURGERY DATE: 2019    Surgeon(s) and Role:     Saloni Gillespie MD - Primary    Preop Diagnosis:  Basal cell carcinoma of skin of nose [C44 311]    Post-Op Diagnosis Codes: * Basal cell carcinoma of skin of nose [C44 311]    Procedure(s) (LRB):  DIVISION/INSET NASOLABIAL FLAP TO NOSE (N/A)    Specimen(s):  * No specimens in log *    Estimated Blood Loss:   Minimal    Drains:  * No LDAs found *    Anesthesia Type:   General    Operative Indications:  Basal cell carcinoma of skin of nose [C44 311]  Status post Mohs    Operative Findings:  As above    Complications:   None    Procedure and Technique:  Deanna Osorio was seen in the holding area preoperatively, the surgical site was marked with her participation, and we reviewed the planned procedure as well as potential risks, complications and limitations  She was taken to the operating room and underwent induction of anesthesia by the anesthesia personnel  The operative field was then prepped and draped in sterile fashion a proper time-out was performed  2 5 loupe magnification was used aid visualization  The site was infiltrated with xylocaine with epinephrine, the flap was divided with a 15 blade  The donor site was repaired initially, the excess tissue was excised with a 15 blade, and linear closure was then performed along the nasolabial fold utilizing 5 O Vicryl sutures buried at the level of the deep dermis this was followed by interrupted 6 0 nylon  At the Mohs defect, the exposed portion of the Mohs defect was freshened by excision of the wound margin with a 15 blade, the base was then curetted with a 15 blade and the flap was trimmed to fit defect and thinned to an appropriate level  Following this the wound was irrigated and the flap was then inset with 5 O Vicryl at the level of the deep dermis and interrupted 6 0 nylon skin sutures  This area was dressed with bacitracin Xeroform and the donor site was protected with benzoin and Steri-Strips     I was present for the entire procedure    Patient Disposition:  PACU     SIGNATURE: Malathi Romo MD  DATE: August 22, 2019  TIME: 12:09 PM

## 2019-08-22 NOTE — DISCHARGE INSTRUCTIONS
[facility]  Dr Jacob Mccauley   [address] [facility], [address]  Phone: [phone]     Postoperative Instructions for Outpatient Surgery     These instructions are being provided by your doctor to give you basic guidelines during your post-op recovery  Please let our office know if your contact information has changed       Please call the office today for an appointment in 1 week for suture removal       Dressings: Steri strip, replace if it falls off  Remove yellow xeroform gauze tomorrow afternoon and then apply bacitracin 3 times daily for 3 days      Activity Restrictions: Nothing strenuous for 48 hours       Bathing:  May shower after removing gauze tomorrow afternoon  Pat incision dry       Medications:    Resume pre-op medications  You may take tylenol, aleve, or ibuprofen for pain control                 Other: Elevate head of bed at night to sleep over the next 48 hours

## 2019-08-30 ENCOUNTER — OFFICE VISIT (OUTPATIENT)
Dept: PLASTIC SURGERY | Facility: CLINIC | Age: 69
End: 2019-08-30

## 2019-08-30 VITALS — BODY MASS INDEX: 25.44 KG/M2 | WEIGHT: 149 LBS | HEIGHT: 64 IN

## 2019-08-30 DIAGNOSIS — Z98.890 POST-OPERATIVE STATE: Primary | ICD-10-CM

## 2019-08-30 PROCEDURE — 99024 POSTOP FOLLOW-UP VISIT: CPT | Performed by: SURGERY

## 2019-08-30 PROCEDURE — 1124F ACP DISCUSS-NO DSCNMKR DOCD: CPT | Performed by: SURGERY

## 2019-08-30 NOTE — PROGRESS NOTES
Sindy Shearer is a 71 y o  Female one week  status post DIVISION/INSET NASOLABIAL FLAP TO NOSE (N/A)  Patient in the office for suture removal  sutures removed  One spitting suture from previous surgery removed  All incisions dry and intact  Sunscreen and silicone scar gel discussed   Patient will follow up in 3 months for scar check

## 2019-10-01 ENCOUNTER — OFFICE VISIT (OUTPATIENT)
Dept: PLASTIC SURGERY | Facility: CLINIC | Age: 69
End: 2019-10-01

## 2019-10-01 ENCOUNTER — TELEPHONE (OUTPATIENT)
Dept: PLASTIC SURGERY | Facility: CLINIC | Age: 69
End: 2019-10-01

## 2019-10-01 VITALS — BODY MASS INDEX: 25.61 KG/M2 | WEIGHT: 150 LBS | HEIGHT: 64 IN

## 2019-10-01 DIAGNOSIS — Z98.890 POST-OPERATIVE STATE: Primary | ICD-10-CM

## 2019-10-01 PROCEDURE — 99024 POSTOP FOLLOW-UP VISIT: CPT | Performed by: SURGERY

## 2019-10-01 NOTE — PROGRESS NOTES
Lesley Cullen is a 71 y o  Status post DIVISION/INSET NASOLABIAL FLAP TO NOSE (N/A) done 8/22/19  The patient called regarding a small spot on her nose and requested an appointment  There was a spitting suture noted and removed  Placed a warm compress to be sure there were no other spitting sutures,no other spitting sutures noted  Bacitracin applied   The patient instructed to continue with warm compresses and to aplly bacitracin to the area

## 2019-12-10 ENCOUNTER — OFFICE VISIT (OUTPATIENT)
Dept: PLASTIC SURGERY | Facility: CLINIC | Age: 69
End: 2019-12-10
Payer: MEDICARE

## 2019-12-10 DIAGNOSIS — Z98.890 STATUS POST FLAP GRAFT: Primary | ICD-10-CM

## 2019-12-10 PROCEDURE — 99214 OFFICE O/P EST MOD 30 MIN: CPT | Performed by: SURGERY

## 2019-12-10 NOTE — PROGRESS NOTES
Assessment/Plan:  Please see HPI  She has had no further issues with spitting sutures, the reconstruction site is healing appropriately  There is a horizontally oriented scar band at the takeoff point of the flap, this may improve with continued/more aggressive massage, if it does not she may benefit from a revision, essentially excision of the scar band  We will out additional 3 months to assess healing and re-evaluate at that time  She will follow up in 3 months  There are no diagnoses linked to this encounter  Subjective: Follow-up visit     Patient ID: Romayne Redman is a 71 y o  female  HPI Cathernie Ortega is status post reconstruction of a Mohs defect of the right nasal ala with a 2 stage nasolabial flap (Dr Tay Warren)  Her dermatologist is [address] [place] [address]  The following portions of the patient's history were reviewed and updated as appropriate: allergies, current medications, past family history, past medical history, past social history, past surgical history and problem list     Review of Systems   Constitutional: Negative for chills and fever  HENT: Negative for hearing loss  Eyes: Negative for discharge and visual disturbance  Respiratory: Negative for chest tightness and shortness of breath  Cardiovascular: Negative for chest pain and leg swelling  Gastrointestinal: Negative for blood in stool, constipation, diarrhea and nausea  Genitourinary: Negative for dysuria  Musculoskeletal: Negative for gait problem  Skin: Negative for rash  Allergic/Immunologic: Negative for immunocompromised state  Neurological: Negative for seizures and headaches  Hematological: Does not bruise/bleed easily  Psychiatric/Behavioral: Negative for dysphoric mood  The patient is not nervous/anxious  Objective: There were no vitals taken for this visit  Physical Exam   Constitutional: She is oriented to person, place, and time   She appears well-developed and well-nourished  HENT:   Head: Normocephalic  Eyes: Pupils are equal, round, and reactive to light  Pulmonary/Chest: Effort normal    Neurological: She is alert and oriented to person, place, and time  Skin: Skin is warm  Reconstruction site right nasal ala with well-healed flap, slight contour excess    Horizontally oriented scar band that takeoff point of flap

## 2020-06-05 ENCOUNTER — TELEMEDICINE (OUTPATIENT)
Dept: PLASTIC SURGERY | Facility: CLINIC | Age: 70
End: 2020-06-05
Payer: MEDICARE

## 2020-06-05 DIAGNOSIS — Z98.890 STATUS POST FLAP GRAFT: Primary | ICD-10-CM

## 2020-06-05 PROCEDURE — 99213 OFFICE O/P EST LOW 20 MIN: CPT | Performed by: SURGERY

## 2020-09-08 ENCOUNTER — OFFICE VISIT (OUTPATIENT)
Dept: PLASTIC SURGERY | Facility: CLINIC | Age: 70
End: 2020-09-08
Payer: MEDICARE

## 2020-09-08 VITALS — TEMPERATURE: 97.9 F

## 2020-09-08 DIAGNOSIS — Z98.890 STATUS POST FLAP GRAFT: Primary | ICD-10-CM

## 2020-09-08 PROCEDURE — 99214 OFFICE O/P EST MOD 30 MIN: CPT | Performed by: SURGERY

## 2020-09-08 NOTE — PROGRESS NOTES
Assessment/Plan:  Please see HPI  While there has been improvement in the scar, the texture and contour could be improved upon  She had been seen by the /technician at her dermatologist's office who suggested resurfacing area with a series of treatments, and I agreed this would be a good idea  If after several resurfacing temp she would continues to desire improvement we certainly could consider surgical intervention/scar revision  She agrees with this strategy, and we will see her again in 3 months, sooner gladis Gusman There are no diagnoses linked to this encounter  Subjective: Follow-up visit     Patient ID: Edda Erickson is a 79 y o  female  HPI Izabella Rossjarod presents for follow-up, 1 year status post division and inset 2 stage nasolabial flap to a Mohs defect of the right nose  The following portions of the patient's history were reviewed and updated as appropriate: allergies, current medications, past family history, past medical history, past social history, past surgical history and problem list     Review of Systems   Constitutional: Negative for chills and fever  HENT: Negative for hearing loss  Eyes: Positive for visual disturbance  Negative for discharge  Respiratory: Negative for chest tightness and shortness of breath  Cardiovascular: Negative for chest pain and leg swelling  Gastrointestinal: Negative for blood in stool, constipation, diarrhea and nausea  Genitourinary: Negative for dysuria  Musculoskeletal: Negative for gait problem  Skin: Negative for rash  Allergic/Immunologic: Negative for immunocompromised state  Neurological: Negative for seizures and headaches  Hematological: Does not bruise/bleed easily  Psychiatric/Behavioral: Negative for dysphoric mood  The patient is not nervous/anxious  Objective:      Temp 97 9 °F (36 6 °C) (Temporal)          Physical Exam  Constitutional:       Appearance: Normal appearance     HENT: Head: Normocephalic  Eyes:      Extraocular Movements: Extraocular movements intact  Pupils: Pupils are equal, round, and reactive to light  Neck:      Musculoskeletal: Normal range of motion and neck supple  Pulmonary:      Effort: Pulmonary effort is normal    Abdominal:      Palpations: Abdomen is soft  Musculoskeletal: Normal range of motion  Skin:     General: Skin is warm  Comments: Well-healed flap right nasal ala, with texture/contour irregularities at flap takeoff point, improved somewhat over time   Neurological:      General: No focal deficit present  Mental Status: She is alert and oriented to person, place, and time     Psychiatric:         Mood and Affect: Mood normal          Behavior: Behavior normal

## 2021-06-04 ENCOUNTER — OFFICE VISIT (OUTPATIENT)
Dept: PLASTIC SURGERY | Facility: CLINIC | Age: 71
End: 2021-06-04
Payer: MEDICARE

## 2021-06-04 DIAGNOSIS — Z98.890 STATUS POST FLAP GRAFT: Primary | ICD-10-CM

## 2021-06-04 PROCEDURE — 99214 OFFICE O/P EST MOD 30 MIN: CPT | Performed by: SURGERY

## 2021-06-04 NOTE — PROGRESS NOTES
Assessment/Plan: please see HPI  She has undergone approximately 3 resurfacing treatments at her dermatologist's office, and is quite pleased with the results  She is aware, that if she has any concerns regarding the appearance of the reconstruction we will certainly consider revision if appropriate  At this point she is pleased with the results and happy to leave well enough alone  She will call us if she would like to discuss this in further       There are no diagnoses linked to this encounter  Subjective: Follow-up visit     Patient ID: Randall Napier is a 70 y o  female  HPI Philip Machado presents in follow-up, status post flap reconstruction of a Mohs defect of the right nose  The following portions of the patient's history were reviewed and updated as appropriate: allergies, current medications, past family history, past medical history, past social history, past surgical history and problem list     Review of Systems   Constitutional: Negative for chills and fever  HENT: Negative for hearing loss  Eyes: Positive for visual disturbance  Negative for discharge  Respiratory: Negative for chest tightness and shortness of breath  Cardiovascular: Negative for chest pain and leg swelling  Gastrointestinal: Negative for anal bleeding, blood in stool and constipation  Genitourinary: Negative for dysuria  Musculoskeletal: Negative for gait problem  Skin: Negative for rash  Allergic/Immunologic: Negative for immunocompromised state  Neurological: Negative for seizures and headaches  Hematological: Does not bruise/bleed easily  Psychiatric/Behavioral: Negative for dysphoric mood  The patient is not nervous/anxious  Objective: There were no vitals taken for this visit  Physical Exam  Constitutional:       Appearance: Normal appearance  HENT:      Head: Normocephalic  Eyes:      Extraocular Movements: Extraocular movements intact        Pupils: Pupils are equal, round, and reactive to light  Neck:      Musculoskeletal: Normal range of motion and neck supple  Cardiovascular:      Rate and Rhythm: Normal rate  Pulmonary:      Effort: Pulmonary effort is normal    Abdominal:      Palpations: Abdomen is soft  Musculoskeletal: Normal range of motion  Skin:     General: Skin is warm  Comments: Well-healed flap reconstruction right nose, slight contour excess, less noticeable visibly status post resurfacing   Neurological:      Mental Status: She is alert and oriented to person, place, and time     Psychiatric:         Mood and Affect: Mood normal

## (undated) DEVICE — GLOVE INDICATOR PI UNDERGLOVE SZ 7.5 BLUE

## (undated) DEVICE — TUBING SUCTION 5MM X 12 FT

## (undated) DEVICE — ZIMMER SKIN GRAFT CARRIER 8 INCH LENGTH: Brand: DERMACARRIERS

## (undated) DEVICE — IV CATH INTROCAN 18G X 1 1/4 SAFETY

## (undated) DEVICE — 2000CC GUARDIAN II: Brand: GUARDIAN

## (undated) DEVICE — PAD GROUNDING ADULT

## (undated) DEVICE — NEEDLE 25G X 1 1/2

## (undated) DEVICE — OCCLUSIVE GAUZE STRIP,3% BISMUTH TRIBROMOPHENATE IN PETROLATUM BLEND: Brand: XEROFORM

## (undated) DEVICE — 10FR FRAZIER SUCTION HANDLE: Brand: CARDINAL HEALTH

## (undated) DEVICE — POV-IOD SWAB STICKS

## (undated) DEVICE — ZIMMER SKIN GRAFT CARRIER 16 INCH  LENGTH: Brand: DERMACARRIERS

## (undated) DEVICE — INTENDED FOR TISSUE SEPARATION, AND OTHER PROCEDURES THAT REQUIRE A SHARP SURGICAL BLADE TO PUNCTURE OR CUT.: Brand: BARD-PARKER ® CARBON RIB-BACK BLADES

## (undated) DEVICE — ELECTRODE NEEDLE MEGAFINE 2IN E-Z CLEAN MEGADYNE -0118

## (undated) DEVICE — BETHLEHEM UNIVERSAL OUTPATIENT: Brand: CARDINAL HEALTH

## (undated) DEVICE — LIGHT HANDLE COVER SLEEVE DISP BLUE STELLAR

## (undated) DEVICE — 3M™ STERI-STRIP™ REINFORCED ADHESIVE SKIN CLOSURES, R1547, 1/2 IN X 4 IN (12 MM X 100 MM), 6 STRIPS/ENVELOPE: Brand: 3M™ STERI-STRIP™

## (undated) DEVICE — SPONGE STICK WITH PVP-I: Brand: KENDALL

## (undated) DEVICE — TONGUE DEPRESSOR STERILE

## (undated) DEVICE — ELECTRODE BLADE MOD E-Z CLEAN  2.75IN 7CM -0012AM

## (undated) DEVICE — DRESSING SILON DUAL-DRESS 50 FOAM 5.5 X 6 IN

## (undated) DEVICE — Device

## (undated) DEVICE — CURITY NON-ADHERENT STRIPS: Brand: CURITY

## (undated) DEVICE — GLOVE SRG BIOGEL 7

## (undated) DEVICE — PLUMEPEN PRO 10FT

## (undated) DEVICE — ELECTRODE BLADE MOD E-Z CLEAN 2.5IN 6.4CM -0012M

## (undated) DEVICE — 3M™ STERI-STRIP™ COMPOUND BENZOIN TINCTURE 40 BAGS/CARTON 4 CARTONS/CASE C1544: Brand: 3M™ STERI-STRIP™

## (undated) DEVICE — VIAL DECANTER

## (undated) DEVICE — MEDI-VAC YANKAUER SUCTION HANDLE W/BULBOUS AND CONTROL VENT: Brand: CARDINAL HEALTH

## (undated) DEVICE — BETHLEHEM UNIVERSAL MINOR GEN: Brand: CARDINAL HEALTH

## (undated) DEVICE — NEEDLE 27 G X 1 1/4

## (undated) DEVICE — SUT ETHILON 6-0 PC-1 18 IN 1856G

## (undated) DEVICE — TIBURON SPLIT SHEET: Brand: CONVERTORS